# Patient Record
Sex: MALE | Race: WHITE | Employment: FULL TIME | URBAN - METROPOLITAN AREA
[De-identification: names, ages, dates, MRNs, and addresses within clinical notes are randomized per-mention and may not be internally consistent; named-entity substitution may affect disease eponyms.]

---

## 2017-11-09 PROBLEM — M17.11 OSTEOARTHRITIS OF RIGHT KNEE: Status: ACTIVE | Noted: 2017-11-09

## 2017-11-09 NOTE — H&P
Patient Name:  Kamille Stinson  YOB: 1965      Chief Complaint:  Left knee pain. History of Chief Complaint:  Radha Cantu comes in today for evaluation of left knee pain. He was previously evaluated for difficulty range of motion in the posterior and medial aspects for what seemed to be symptoms that were mechanical in nature. At last evaluation he underwent injection with minimal results. He comes in today for followup of his MRI. Past Medical/Surgical History:    Disease/Disorder Type Date Side Surgery Date Side Comment   Hypertension            Allergies:  No known allergies. Ingredient Reaction Medication Name Comment   NO KNOWN ALLERGIES          Current Medications:    Medication Directions   Mobic 15 mg tablet take 1 tablet by oral route  every day     Social History:    SMOKING  Status Tobacco Type Units Per Day Yrs Used   Never smoker          Family History:    Disease Detail Family Member Age Cause of Death Comments   Cancer, unknown Mother  N    Hypertension Mother  N      Review of Systems:    Pertinent positives include joint pain and joint stiffness. Pertinent negatives include fainting. Vitals:  Date BP Pulse Temp (F) Resp. (per min.) Height (Total in.) Weight (lbs.) BMI   09/29/2017     70.00  34.44     Physical Examination:    Psychiatric/General:  No acute distress; pleasant and accommodating. HEENT:  Moist mucous membranes intact. Lymphatic:  Neck is supple with no lymphadenopathy upon evaluation. Respiratory:   Equal bilateral chest wall movement with inspiration; no wheezes or strider audible. Cardiovascular:    Regular rate and rhythm, as noted by upper extremity pulses. Extremities: On evaluation of the left lower extremity, he continues to have tenderness upon palpation of the medial aspect of the knee, flexion and rotation reproduced symptoms about the medial aspect of the knee.  Gross motor is intact, skin is without ulceration or lesion. Data/Radiograph Evaluation:    MRI is reviewed and notes most significant findings in the medial compartment with meniscal tear, posterior horn medial meniscus and inferior articular surface tear with disruption consistent with a displaced tear and mild chondromalacic change of the patellofemoral joint is also noted. Assessment:   His symptoms seem to be mechanical and there is a posterior horn medial meniscus tear noted on MRI. Recommendation:  Will continue plans for surgical arthroscopy and debridement of medial meniscal tear. The risks include infection, bleeding, deep venous thrombosis, pulmonary embolism, heart attack, stroke, death, arthrofibrosis, need for manipulation, neurovascular compromise, need for revision surgical intervention, persistent long-term pain, need for chronic pain management, and metallic allergies. Will continue plans to schedule surgery.             Mei Oviedo DO

## 2017-11-09 NOTE — H&P
Patient Name:  Franco Ram  YOB: 1965      Chief Complaint:  Left knee pain. History of Chief Complaint:  Orlando Ding comes in today for evaluation of left knee pain. He was previously evaluated for difficulty range of motion in the posterior and medial aspects for what seemed to be symptoms that were mechanical in nature. At last evaluation he underwent injection with minimal results. He comes in today for followup of his MRI. Past Medical/Surgical History:    Disease/Disorder Type Date Side Surgery Date Side Comment   Hypertension            Allergies:  No known allergies. Ingredient Reaction Medication Name Comment   NO KNOWN ALLERGIES          Current Medications:    Medication Directions   Mobic 15 mg tablet take 1 tablet by oral route  every day     Social History:    SMOKING  Status Tobacco Type Units Per Day Yrs Used   Never smoker          Family History:    Disease Detail Family Member Age Cause of Death Comments   Cancer, unknown Mother  N    Hypertension Mother  N      Review of Systems:    Pertinent positives include joint pain and joint stiffness. Pertinent negatives include fainting. Vitals:  Date BP Pulse Temp (F) Resp. (per min.) Height (Total in.) Weight (lbs.) BMI   09/29/2017     70.00  34.44     Physical Examination:    Psychiatric/General:  No acute distress; pleasant and accommodating. HEENT:  Moist mucous membranes intact. Lymphatic:  Neck is supple with no lymphadenopathy upon evaluation. Respiratory:   Equal bilateral chest wall movement with inspiration; no wheezes or strider audible. Cardiovascular:    Regular rate and rhythm, as noted by upper extremity pulses. Extremities: On evaluation of the left lower extremity, he continues to have tenderness upon palpation of the medial aspect of the knee, flexion and rotation reproduced symptoms about the medial aspect of the knee.  Gross motor is intact, skin is without ulceration or lesion. Data/Radiograph Evaluation:    MRI is reviewed and notes most significant findings in the medial compartment with meniscal tear, posterior horn medial meniscus and inferior articular surface tear with disruption consistent with a displaced tear and mild chondromalacic change of the patellofemoral joint is also noted. Assessment:   His symptoms seem to be mechanical and there is a posterior horn medial meniscus tear noted on MRI. Recommendation:  Will continue plans for surgical arthroscopy and debridement of medial meniscal tear. The risks include infection, bleeding, deep venous thrombosis, pulmonary embolism, heart attack, stroke, death, arthrofibrosis, need for manipulation, neurovascular compromise, need for revision surgical intervention, persistent long-term pain, need for chronic pain management, and metallic allergies. Will continue plans to schedule surgery.             Aris Curry, DO

## 2017-11-09 NOTE — DISCHARGE INSTRUCTIONS
[facility]  Dr. Alves Goes Post-Operative Instructions Knee Arthroscopy Scope    Diet:  1. Begin with liquids and light foods such as Jell-O and soups. 2. Advance as tolerated to your regular diet if not nauseated. First 24 hours:  1. Be in the care of a responsible adult. 2. Do not drive or operate machinery. 3. Do not drink alcoholic beverages. Activities:  1. Elevate the limb above hip and preferably above chest for 48 hours. 2. Ice should be applied to the knee in a waterproof bag for 15-30 minutes each hour while awake for first 48 hours. 3. Normal walking is encouraged after 2 days. 4. Do not engage in activities that increase your pain such as stair-climbing or prolonged standing. 5. Return to work depends on your type of employment. 6.  Follow-up with Dr. Ferdinand Agosto in 7-10 days post-operatively. Exercise:  1. Begin exercises the day of surgery for both legs and repeat hourly while awake:  *Quad sets (tightening the thigh muscles)  *? Straight leg raises (lift and hold 12-18 off bed or floor for 8 count)  *? Vigorous ankle pumps (toes towards and away from head)  2. Your routine exercises generally can be started one week after surgery as long as you can bend the knee freely to at least 90 degrees. Wound Care:  1. Maintain your postoperative dressing. Loosen the ACE wrap if swelling of the foot or ankle occurs. 2. Remove your surgical dressing on the second post op day. Cover the wounds with Band-Aids and re-wrap the ACE bandage until swelling of knee is gone. To maintain good circulation, do not wrap too tightly. 3. Keep the surgical incisions dry until your sutures are removed when you see your doctor. Use a plastic bag with rubber bands to cover the limb during showers. Immersing the limb in water is to be avoided. Medications:  1. Strong oral narcotic pain medications have been prescribed for the first few days. Use only as directed.  No pain medication is capable of taking away all the pain. Taking your pills at regular intervals will give you the best chance of having less pain. 2. If you need a refill PLEASE PLAN AHEAD. Call our office during regular hours (8-5). 3. Do not combine with alcoholic beverages. 4. Be careful as you walk, climb stairs or drive as mild dizziness is not unusual.  5. Do not take medications that have not been prescribed by your surgeon. 6. You may switch to over the counter pain medication of your choice as you become more comfortable. WHEN TO CALL YOUR SURGEON:  1. Significant swelling or any new numbness in the limb that was operated on  2. Unrelenting pain  3. Fever or Chills  4. Redness around incisions  5. Color change in foot or toes  6. Continuous drainage or bleeding from wounds (a small amount of drainage is expected)  7. Any other worrisome condition    WHEN TO CALL YOUR REGULAR DOCTOR:  1. Flare up of any of your regular medical conditions    WHEN TO CALL 911:  1. Chest Pain  2. Shortness of Breath  3. Any other acute serious condition    CALL THE OFFICE:   If you have severe pain unrelieved by the medications;   If you have a fever of 101.0°F or greater;    If you notice excessive swelling, redness, or persistent drainage from the incision or IV site; [facility] office number is [phone] from 8:00am to 5:00pm Monday through Friday. After 5:00pm, on weekends, or holidays, please leave a message with our answering service and the doctor on-call will get back to you shortly. DISCHARGE SUMMARY from Nurse    PATIENT INSTRUCTIONS:    After general anesthesia or intravenous sedation, for 24 hours or while taking prescription Narcotics:  · Limit your activities  · Do not drive and operate hazardous machinery  · Do not make important personal or business decisions  · Do  not drink alcoholic beverages  · If you have not urinated within 8 hours after discharge, please contact your surgeon on call.     Report the following to your surgeon:  · Excessive pain, swelling, redness or odor of or around the surgical area  · Temperature over 100.5  · Nausea and vomiting lasting longer than 4 hours or if unable to take medications  · Any signs of decreased circulation or nerve impairment to extremity: change in color, persistent  numbness, tingling, coldness or increase pain  · Any questions    What to do at Home:  Recommended activity: Ambulate in house,     If you experience any of the following symptoms above, please follow up with Dr. Linnea Spicer. *  Please give a list of your current medications to your Primary Care Provider. *  Please update this list whenever your medications are discontinued, doses are      changed, or new medications (including over-the-counter products) are added. *  Please carry medication information at all times in case of emergency situations. These are general instructions for a healthy lifestyle:    No smoking/ No tobacco products/ Avoid exposure to second hand smoke  Surgeon General's Warning:  Quitting smoking now greatly reduces serious risk to your health. Obesity, smoking, and sedentary lifestyle greatly increases your risk for illness    A healthy diet, regular physical exercise & weight monitoring are important for maintaining a healthy lifestyle    You may be retaining fluid if you have a history of heart failure or if you experience any of the following symptoms:  Weight gain of 3 pounds or more overnight or 5 pounds in a week, increased swelling in our hands or feet or shortness of breath while lying flat in bed. Please call your doctor as soon as you notice any of these symptoms; do not wait until your next office visit.     Recognize signs and symptoms of STROKE:    F-face looks uneven    A-arms unable to move or move unevenly    S-speech slurred or non-existent    T-time-call 911 as soon as signs and symptoms begin-DO NOT go       Back to bed or wait to see if you get better-TIME IS BRAIN. Warning Signs of HEART ATTACK     Call 911 if you have these symptoms:   Chest discomfort. Most heart attacks involve discomfort in the center of the chest that lasts more than a few minutes, or that goes away and comes back. It can feel like uncomfortable pressure, squeezing, fullness, or pain.  Discomfort in other areas of the upper body. Symptoms can include pain or discomfort in one or both arms, the back, neck, jaw, or stomach.  Shortness of breath with or without chest discomfort.  Other signs may include breaking out in a cold sweat, nausea, or lightheadedness. Don't wait more than five minutes to call 911 - MINUTES MATTER! Fast action can save your life. Calling 911 is almost always the fastest way to get lifesaving treatment. Emergency Medical Services staff can begin treatment when they arrive -- up to an hour sooner than if someone gets to the hospital by car. Patient armband removed and shredded     The discharge information has been reviewed with the patient and caregiver. The patient and caregiver verbalized understanding. Discharge medications reviewed with the patient and caregiver and appropriate educational materials and side effects teaching were provided. ___________________________________________________________________________________________________________________________________  .

## 2017-11-10 ENCOUNTER — HOSPITAL ENCOUNTER (OUTPATIENT)
Dept: PREADMISSION TESTING | Age: 52
Discharge: HOME | End: 2017-11-10
Payer: COMMERCIAL

## 2017-11-10 LAB
ANION GAP SERPL CALC-SCNC: 4 MMOL/L (ref 3–18)
APPEARANCE UR: CLEAR
ATRIAL RATE: 71 BPM
BACTERIA URNS QL MICRO: NEGATIVE /HPF
BILIRUB UR QL: NEGATIVE
BUN SERPL-MCNC: 17 MG/DL (ref 7–18)
BUN/CREAT SERPL: 17 (ref 12–20)
CALCIUM SERPL-MCNC: 9.6 MG/DL (ref 8.5–10.1)
CALCULATED P AXIS, ECG09: 51 DEGREES
CALCULATED R AXIS, ECG10: 18 DEGREES
CALCULATED T AXIS, ECG11: 24 DEGREES
CHLORIDE SERPL-SCNC: 103 MMOL/L (ref 100–108)
CO2 SERPL-SCNC: 28 MMOL/L (ref 21–32)
COLOR UR: YELLOW
CREAT SERPL-MCNC: 1.03 MG/DL (ref 0.6–1.3)
DIAGNOSIS, 93000: NORMAL
EPITH CASTS URNS QL MICRO: NEGATIVE /LPF (ref 0–5)
ERYTHROCYTE [DISTWIDTH] IN BLOOD BY AUTOMATED COUNT: 13.6 % (ref 11.6–14.5)
GLUCOSE SERPL-MCNC: 100 MG/DL (ref 74–99)
GLUCOSE UR STRIP.AUTO-MCNC: NEGATIVE MG/DL
HCT VFR BLD AUTO: 44 % (ref 36–48)
HGB BLD-MCNC: 15.1 G/DL (ref 13–16)
HGB UR QL STRIP: ABNORMAL
KETONES UR QL STRIP.AUTO: NEGATIVE MG/DL
LEUKOCYTE ESTERASE UR QL STRIP.AUTO: NEGATIVE
MCH RBC QN AUTO: 29.3 PG (ref 24–34)
MCHC RBC AUTO-ENTMCNC: 34.3 G/DL (ref 31–37)
MCV RBC AUTO: 85.4 FL (ref 74–97)
NITRITE UR QL STRIP.AUTO: NEGATIVE
P-R INTERVAL, ECG05: 160 MS
PH UR STRIP: 6 [PH] (ref 5–8)
PLATELET # BLD AUTO: 343 K/UL (ref 135–420)
PMV BLD AUTO: 9.6 FL (ref 9.2–11.8)
POTASSIUM SERPL-SCNC: 4.3 MMOL/L (ref 3.5–5.5)
PROT UR STRIP-MCNC: NEGATIVE MG/DL
Q-T INTERVAL, ECG07: 356 MS
QRS DURATION, ECG06: 84 MS
QTC CALCULATION (BEZET), ECG08: 386 MS
RBC # BLD AUTO: 5.15 M/UL (ref 4.7–5.5)
RBC #/AREA URNS HPF: NORMAL /HPF (ref 0–5)
SODIUM SERPL-SCNC: 135 MMOL/L (ref 136–145)
SP GR UR REFRACTOMETRY: 1.02 (ref 1–1.03)
UROBILINOGEN UR QL STRIP.AUTO: 1 EU/DL (ref 0.2–1)
VENTRICULAR RATE, ECG03: 71 BPM
WBC # BLD AUTO: 9.5 K/UL (ref 4.6–13.2)
WBC URNS QL MICRO: NORMAL /HPF (ref 0–5)

## 2017-11-10 PROCEDURE — 36415 COLL VENOUS BLD VENIPUNCTURE: CPT | Performed by: ORTHOPAEDIC SURGERY

## 2017-11-10 PROCEDURE — 93005 ELECTROCARDIOGRAM TRACING: CPT

## 2017-11-10 PROCEDURE — 81001 URINALYSIS AUTO W/SCOPE: CPT | Performed by: ORTHOPAEDIC SURGERY

## 2017-11-10 PROCEDURE — 85027 COMPLETE CBC AUTOMATED: CPT | Performed by: ORTHOPAEDIC SURGERY

## 2017-11-10 PROCEDURE — 80048 BASIC METABOLIC PNL TOTAL CA: CPT | Performed by: ORTHOPAEDIC SURGERY

## 2017-11-21 ENCOUNTER — HOSPITAL ENCOUNTER (OUTPATIENT)
Age: 52
Setting detail: OUTPATIENT SURGERY
Discharge: HOME | End: 2017-11-21
Attending: ORTHOPAEDIC SURGERY | Admitting: ORTHOPAEDIC SURGERY
Payer: COMMERCIAL

## 2017-11-21 ENCOUNTER — ANESTHESIA (OUTPATIENT)
Dept: SURGERY | Age: 52
End: 2017-11-21
Payer: COMMERCIAL

## 2017-11-21 ENCOUNTER — ANESTHESIA EVENT (OUTPATIENT)
Dept: SURGERY | Age: 52
End: 2017-11-21
Payer: COMMERCIAL

## 2017-11-21 VITALS
BODY MASS INDEX: 34.23 KG/M2 | HEIGHT: 70 IN | RESPIRATION RATE: 16 BRPM | TEMPERATURE: 97.6 F | SYSTOLIC BLOOD PRESSURE: 137 MMHG | DIASTOLIC BLOOD PRESSURE: 73 MMHG | OXYGEN SATURATION: 97 % | WEIGHT: 239.06 LBS | HEART RATE: 70 BPM

## 2017-11-21 PROBLEM — M23.322 MEDIAL MENISCUS, POSTERIOR HORN DERANGEMENT, LEFT: Status: ACTIVE | Noted: 2017-11-21

## 2017-11-21 PROCEDURE — 77030002933 HC SUT MCRYL J&J -A: Performed by: ORTHOPAEDIC SURGERY

## 2017-11-21 PROCEDURE — 76060000032 HC ANESTHESIA 0.5 TO 1 HR: Performed by: ORTHOPAEDIC SURGERY

## 2017-11-21 PROCEDURE — 74011000250 HC RX REV CODE- 250

## 2017-11-21 PROCEDURE — 77030034478 HC TU IRR ARTHRO PT ARTH -B: Performed by: ORTHOPAEDIC SURGERY

## 2017-11-21 PROCEDURE — 74011250636 HC RX REV CODE- 250/636

## 2017-11-21 PROCEDURE — 77030032490 HC SLV COMPR SCD KNE COVD -B: Performed by: ORTHOPAEDIC SURGERY

## 2017-11-21 PROCEDURE — 76210000006 HC OR PH I REC 0.5 TO 1 HR: Performed by: ORTHOPAEDIC SURGERY

## 2017-11-21 PROCEDURE — 74011000250 HC RX REV CODE- 250: Performed by: ORTHOPAEDIC SURGERY

## 2017-11-21 PROCEDURE — 74011250636 HC RX REV CODE- 250/636: Performed by: ORTHOPAEDIC SURGERY

## 2017-11-21 PROCEDURE — 76210000021 HC REC RM PH II 0.5 TO 1 HR: Performed by: ORTHOPAEDIC SURGERY

## 2017-11-21 PROCEDURE — 76010000138 HC OR TIME 0.5 TO 1 HR: Performed by: ORTHOPAEDIC SURGERY

## 2017-11-21 PROCEDURE — 77030018835 HC SOL IRR LR ICUM -A: Performed by: ORTHOPAEDIC SURGERY

## 2017-11-21 PROCEDURE — 77030036563 HC WRP CLD THER KNE S2SG -B: Performed by: ORTHOPAEDIC SURGERY

## 2017-11-21 PROCEDURE — 77030012508 HC MSK AIRWY LMA AMBU -A: Performed by: ANESTHESIOLOGY

## 2017-11-21 PROCEDURE — 77030020782 HC GWN BAIR PAWS FLX 3M -B: Performed by: ORTHOPAEDIC SURGERY

## 2017-11-21 PROCEDURE — 77030006884 HC BLD SHV INCIS S&N -B: Performed by: ORTHOPAEDIC SURGERY

## 2017-11-21 PROCEDURE — 74011250636 HC RX REV CODE- 250/636: Performed by: ANESTHESIOLOGY

## 2017-11-21 RX ORDER — ONDANSETRON 2 MG/ML
INJECTION INTRAMUSCULAR; INTRAVENOUS AS NEEDED
Status: DISCONTINUED | OUTPATIENT
Start: 2017-11-21 | End: 2017-11-21 | Stop reason: HOSPADM

## 2017-11-21 RX ORDER — SODIUM CHLORIDE 0.9 % (FLUSH) 0.9 %
5-10 SYRINGE (ML) INJECTION AS NEEDED
Status: DISCONTINUED | OUTPATIENT
Start: 2017-11-21 | End: 2017-11-21 | Stop reason: HOSPADM

## 2017-11-21 RX ORDER — ONDANSETRON 2 MG/ML
4 INJECTION INTRAMUSCULAR; INTRAVENOUS ONCE
Status: DISCONTINUED | OUTPATIENT
Start: 2017-11-21 | End: 2017-11-21 | Stop reason: HOSPADM

## 2017-11-21 RX ORDER — CEFAZOLIN SODIUM 2 G/50ML
2 SOLUTION INTRAVENOUS ONCE
Status: DISCONTINUED | OUTPATIENT
Start: 2017-11-21 | End: 2017-11-21 | Stop reason: HOSPADM

## 2017-11-21 RX ORDER — GLYCOPYRROLATE 0.2 MG/ML
INJECTION INTRAMUSCULAR; INTRAVENOUS AS NEEDED
Status: DISCONTINUED | OUTPATIENT
Start: 2017-11-21 | End: 2017-11-21 | Stop reason: HOSPADM

## 2017-11-21 RX ORDER — PROPOFOL 10 MG/ML
INJECTION, EMULSION INTRAVENOUS AS NEEDED
Status: DISCONTINUED | OUTPATIENT
Start: 2017-11-21 | End: 2017-11-21 | Stop reason: HOSPADM

## 2017-11-21 RX ORDER — MAGNESIUM SULFATE 100 %
4 CRYSTALS MISCELLANEOUS AS NEEDED
Status: DISCONTINUED | OUTPATIENT
Start: 2017-11-21 | End: 2017-11-21 | Stop reason: HOSPADM

## 2017-11-21 RX ORDER — FENTANYL CITRATE 50 UG/ML
INJECTION, SOLUTION INTRAMUSCULAR; INTRAVENOUS AS NEEDED
Status: DISCONTINUED | OUTPATIENT
Start: 2017-11-21 | End: 2017-11-21 | Stop reason: HOSPADM

## 2017-11-21 RX ORDER — DOCUSATE SODIUM 100 MG/1
100 CAPSULE, LIQUID FILLED ORAL 2 TIMES DAILY
COMMUNITY

## 2017-11-21 RX ORDER — IBUPROFEN 200 MG
TABLET ORAL
COMMUNITY

## 2017-11-21 RX ORDER — ASPIRIN 325 MG
325 TABLET ORAL DAILY
COMMUNITY

## 2017-11-21 RX ORDER — FENTANYL CITRATE 50 UG/ML
25 INJECTION, SOLUTION INTRAMUSCULAR; INTRAVENOUS
Status: DISPENSED | OUTPATIENT
Start: 2017-11-21 | End: 2017-11-21

## 2017-11-21 RX ORDER — SODIUM CHLORIDE, SODIUM LACTATE, POTASSIUM CHLORIDE, CALCIUM CHLORIDE 600; 310; 30; 20 MG/100ML; MG/100ML; MG/100ML; MG/100ML
50 INJECTION, SOLUTION INTRAVENOUS CONTINUOUS
Status: DISCONTINUED | OUTPATIENT
Start: 2017-11-21 | End: 2017-11-21 | Stop reason: HOSPADM

## 2017-11-21 RX ORDER — DEXTROSE 50 % IN WATER (D50W) INTRAVENOUS SYRINGE
25-50 AS NEEDED
Status: DISCONTINUED | OUTPATIENT
Start: 2017-11-21 | End: 2017-11-21 | Stop reason: HOSPADM

## 2017-11-21 RX ORDER — TAMSULOSIN HYDROCHLORIDE 0.4 MG/1
0.4 CAPSULE ORAL DAILY
COMMUNITY

## 2017-11-21 RX ORDER — INSULIN LISPRO 100 [IU]/ML
INJECTION, SOLUTION INTRAVENOUS; SUBCUTANEOUS ONCE
Status: DISCONTINUED | OUTPATIENT
Start: 2017-11-21 | End: 2017-11-21 | Stop reason: HOSPADM

## 2017-11-21 RX ORDER — NALOXONE HYDROCHLORIDE 0.4 MG/ML
0.1 INJECTION, SOLUTION INTRAMUSCULAR; INTRAVENOUS; SUBCUTANEOUS
Status: DISCONTINUED | OUTPATIENT
Start: 2017-11-21 | End: 2017-11-21 | Stop reason: HOSPADM

## 2017-11-21 RX ORDER — LIDOCAINE HYDROCHLORIDE 20 MG/ML
INJECTION, SOLUTION EPIDURAL; INFILTRATION; INTRACAUDAL; PERINEURAL AS NEEDED
Status: DISCONTINUED | OUTPATIENT
Start: 2017-11-21 | End: 2017-11-21 | Stop reason: HOSPADM

## 2017-11-21 RX ORDER — OXYCODONE AND ACETAMINOPHEN 5; 325 MG/1; MG/1
1 TABLET ORAL AS NEEDED
Status: DISCONTINUED | OUTPATIENT
Start: 2017-11-21 | End: 2017-11-21 | Stop reason: HOSPADM

## 2017-11-21 RX ORDER — HYDROMORPHONE HYDROCHLORIDE 2 MG/ML
0.5 INJECTION, SOLUTION INTRAMUSCULAR; INTRAVENOUS; SUBCUTANEOUS
Status: DISCONTINUED | OUTPATIENT
Start: 2017-11-21 | End: 2017-11-21 | Stop reason: HOSPADM

## 2017-11-21 RX ORDER — HYDROCODONE BITARTRATE AND ACETAMINOPHEN 5; 325 MG/1; MG/1
1 TABLET ORAL
Qty: 30 TAB | Refills: 0 | Status: SHIPPED | OUTPATIENT
Start: 2017-11-21

## 2017-11-21 RX ORDER — SODIUM CHLORIDE, SODIUM LACTATE, POTASSIUM CHLORIDE, CALCIUM CHLORIDE 600; 310; 30; 20 MG/100ML; MG/100ML; MG/100ML; MG/100ML
125 INJECTION, SOLUTION INTRAVENOUS CONTINUOUS
Status: DISCONTINUED | OUTPATIENT
Start: 2017-11-21 | End: 2017-11-21 | Stop reason: HOSPADM

## 2017-11-21 RX ORDER — KETOROLAC TROMETHAMINE 30 MG/ML
INJECTION, SOLUTION INTRAMUSCULAR; INTRAVENOUS AS NEEDED
Status: DISCONTINUED | OUTPATIENT
Start: 2017-11-21 | End: 2017-11-21 | Stop reason: HOSPADM

## 2017-11-21 RX ORDER — MIDAZOLAM HYDROCHLORIDE 1 MG/ML
INJECTION, SOLUTION INTRAMUSCULAR; INTRAVENOUS AS NEEDED
Status: DISCONTINUED | OUTPATIENT
Start: 2017-11-21 | End: 2017-11-21 | Stop reason: HOSPADM

## 2017-11-21 RX ORDER — DEXAMETHASONE SODIUM PHOSPHATE 4 MG/ML
INJECTION, SOLUTION INTRA-ARTICULAR; INTRALESIONAL; INTRAMUSCULAR; INTRAVENOUS; SOFT TISSUE AS NEEDED
Status: DISCONTINUED | OUTPATIENT
Start: 2017-11-21 | End: 2017-11-21 | Stop reason: HOSPADM

## 2017-11-21 RX ADMIN — FENTANYL CITRATE 25 MCG: 50 INJECTION, SOLUTION INTRAMUSCULAR; INTRAVENOUS at 10:27

## 2017-11-21 RX ADMIN — ONDANSETRON 4 MG: 2 INJECTION INTRAMUSCULAR; INTRAVENOUS at 09:29

## 2017-11-21 RX ADMIN — SODIUM CHLORIDE, SODIUM LACTATE, POTASSIUM CHLORIDE, AND CALCIUM CHLORIDE 125 ML/HR: 600; 310; 30; 20 INJECTION, SOLUTION INTRAVENOUS at 09:54

## 2017-11-21 RX ADMIN — PROPOFOL 150 MG: 10 INJECTION, EMULSION INTRAVENOUS at 09:05

## 2017-11-21 RX ADMIN — FENTANYL CITRATE 25 MCG: 50 INJECTION, SOLUTION INTRAMUSCULAR; INTRAVENOUS at 09:58

## 2017-11-21 RX ADMIN — GLYCOPYRROLATE 0.3 MG: 0.2 INJECTION INTRAMUSCULAR; INTRAVENOUS at 09:12

## 2017-11-21 RX ADMIN — DEXAMETHASONE SODIUM PHOSPHATE 4 MG: 4 INJECTION, SOLUTION INTRA-ARTICULAR; INTRALESIONAL; INTRAMUSCULAR; INTRAVENOUS; SOFT TISSUE at 09:14

## 2017-11-21 RX ADMIN — LIDOCAINE HYDROCHLORIDE 60 MG: 20 INJECTION, SOLUTION EPIDURAL; INFILTRATION; INTRACAUDAL; PERINEURAL at 09:05

## 2017-11-21 RX ADMIN — SODIUM CHLORIDE, SODIUM LACTATE, POTASSIUM CHLORIDE, AND CALCIUM CHLORIDE 125 ML/HR: 600; 310; 30; 20 INJECTION, SOLUTION INTRAVENOUS at 07:46

## 2017-11-21 RX ADMIN — FENTANYL CITRATE 25 MCG: 50 INJECTION, SOLUTION INTRAMUSCULAR; INTRAVENOUS at 10:11

## 2017-11-21 RX ADMIN — MIDAZOLAM HYDROCHLORIDE 2 MG: 1 INJECTION, SOLUTION INTRAMUSCULAR; INTRAVENOUS at 08:58

## 2017-11-21 RX ADMIN — FENTANYL CITRATE 100 MCG: 50 INJECTION, SOLUTION INTRAMUSCULAR; INTRAVENOUS at 09:05

## 2017-11-21 RX ADMIN — KETOROLAC TROMETHAMINE 30 MG: 30 INJECTION, SOLUTION INTRAMUSCULAR; INTRAVENOUS at 09:18

## 2017-11-21 NOTE — OP NOTES
OPERATIVE NOTE    Patient: Linda Joyner MRN: [id number]  SSN: [id number]    YOB: 1965  Age: 45 y.o. Sex: male      Indications: This is a 45y.o. year-old male who presents with knee pain. He was positive for meniscal tear on MRI. The patient was admitted for surgery as conservative measures have failed. Date of Procedure: 11/21/2017     Preoperative Diagnosis: LEFT KNEE MEDIAL MENISCAL TEAR    Postoperative Diagnosis: LEFT KNEE MEDIAL MENISCAL TEAR      Procedure: Procedure(s):  LEFT KNEE ARTHROSCOPY/PARTIAL MEDIAL MENISECTOMY    Surgeon(s) and Role:     * Charla Vincent, DO - Primary    Anesthesia: General LMA    Estimated Blood Loss: 5ml    Specimens: * No specimens in log *     Drains: none    Implants: * No implants in log *    Complications: None; patient tolerated the procedure well. Procedure: The patient was greeted by anesthesia and taken to the operative suite, where she underwent general endotracheal anesthesia. The patient was positioned in the supine position on a standard orthopedic table. The left leg was secured with a surgical assist leg snell and sterilely prepped and draped in standard fashion. Standard inferomedial and inferolateral portals were made with a 15-blade scalpel. A 30-degree arthroscope was placed through the lateral portal into the suprapatellar  Pouch. Evaluation of the patellofemoral compartment showed grade 3 changes of the cartilage surface. Evaluation of the medial compartment showed grade 3 changes of the cartilgenus surface. The anterior and posterior cruciate ligament were found to be intact. Grade 1 changes were noted over the lateral compartment. The menesci were evaluated and probed. The medial compartment demonstrated a complex tear . The lateral compartment demonstrated no significant abnormalities.   Arthroscopic meniscectomy was performed with a shaver where appropriated until no loose or unstable cartilage remained upon probing. A chondroplasty was performed over the articular surface of the medial femoral condyle and patella and trochlea. Additional procedures included anterior synovectomy. The knee was irrigated with the remainder of the arthroscopic lavage and injected with 30 ml of .25% Marcaine with Epinephrine and 4 mg of Morphine sulfate. The incisions were reapproximated with Monocryl suture in subcutaneous horizontal mattress fashion x 2. A soft sterile dressing and an ace wrap were placed. The patient recovered from anesthesia and was transferred to the post-anesthesia care unit in stable condition.

## 2017-11-21 NOTE — ANESTHESIA PREPROCEDURE EVALUATION
Anesthetic History   No history of anesthetic complications            Review of Systems / Medical History  Patient summary reviewed, nursing notes reviewed and pertinent labs reviewed    Pulmonary  Within defined limits                 Neuro/Psych   Within defined limits           Cardiovascular    Hypertension                   GI/Hepatic/Renal  Within defined limits              Endo/Other  Within defined limits           Other Findings              Physical Exam    Airway  Mallampati: II  TM Distance: 4 - 6 cm  Neck ROM: normal range of motion   Mouth opening: Normal     Cardiovascular  Regular rate and rhythm,  S1 and S2 normal,  no murmur, click, rub, or gallop             Dental  No notable dental hx       Pulmonary  Breath sounds clear to auscultation               Abdominal  Abdominal exam normal       Other Findings            Anesthetic Plan    ASA: 2  Anesthesia type: general          Induction: Intravenous  Anesthetic plan and risks discussed with: Family and Patient

## 2017-11-21 NOTE — ANESTHESIA POSTPROCEDURE EVALUATION
Post-Anesthesia Evaluation and Assessment    Cardiovascular Function/Vital Signs  Visit Vitals    /66    Pulse 71    Temp 36.3 °C (97.3 °F)    Resp 12    Ht 5' 10\" (1.778 m)    Wt 108.4 kg (239 lb 1 oz)    SpO2 99%    BMI 34.3 kg/m2       Patient is status post Procedure(s):  LEFT KNEE ARTHROSCOPY/PARTIAL MEDIAL MENISECTOMY. Nausea/Vomiting: Controlled. Postoperative hydration reviewed and adequate. Pain:  Pain Scale 1: Numeric (0 - 10) (11/21/17 1031)  Pain Intensity 1: 3 (11/21/17 1031)   Managed. Neurological Status:   Neuro (WDL): Within Defined Limits (11/21/17 1015)   At baseline. Mental Status and Level of Consciousness: Baseline and stable. Pulmonary Status:   O2 Device: Nasal cannula (11/21/17 1015)   Adequate oxygenation and airway patent. Complications related to anesthesia: None    Post-anesthesia assessment completed. No concerns. Patient has met all discharge requirements.     Signed By: Elena Ramos MD

## 2017-11-21 NOTE — IP AVS SNAPSHOT
[address] [address] 
[phone] Patient: Yoshi Carter MRN: [id number] About your hospitalization You were admitted on:  November 21, 2017 You last received care in the:  [facility] PACU You were discharged on:  November 21, 2017 Why you were hospitalized Your primary diagnosis was:  Medial Meniscus, Posterior Horn Derangement, Left Your diagnoses also included:  Osteoarthritis Of Right Knee Things You Need To Do (next 8 weeks) Follow up with Zenaida Bojorquez MD  
  
Phone:  [phone] Where:  [address], [address], [address] [place] [address] Follow up with Barbara Shahid DO Call office to make follow up appointment Phone:  [phone] Where:  [address], Orthopedics and [address] [place] [address] Discharge Orders None A check jason indicates which time of day the medication should be taken. My Medications TAKE these medications as instructed Instructions Each Dose to Equal  
 Morning Noon Evening Bedtime  
 aspirin 325 mg tablet Commonly known as:  ASPIRIN Your last dose was: Your next dose is: Take 325 mg by mouth daily. 325 mg  
    
   
   
   
  
 COLACE 100 mg capsule Generic drug:  docusate sodium Your last dose was: Your next dose is: Take 100 mg by mouth two (2) times a day. 100 mg FLOMAX 0.4 mg capsule Generic drug:  tamsulosin Your last dose was: Your next dose is: Take 0.4 mg by mouth daily. 0.4 mg HYDROcodone-acetaminophen 5-325 mg per tablet Commonly known as:  March Castles Your last dose was: Your next dose is: Take 1 Tab by mouth every eight (8) hours as needed for Pain. Max Daily Amount: 3 Tabs. Indications: Pain 1 Tab ibuprofen 200 mg tablet Commonly known as:  MOTRIN Your last dose was: Your next dose is: Take  by mouth.  
     
   
   
   
  
 lisinopril 2.5 mg tablet Commonly known as:  Merilynn Toy Your last dose was: Your next dose is: Take 2.5 mg by mouth daily. Indications: hypertension 2.5 mg  
    
   
   
   
  
 TYLENOL ARTHRITIS PAIN 650 mg Tber Generic drug:  acetaminophen Your last dose was: Your next dose is: Take 650 mg by mouth every eight (8) hours. 650 mg Where to Get Your Medications Information on where to get these meds will be given to you by the nurse or doctor. ! Ask your nurse or doctor about these medications HYDROcodone-acetaminophen 5-325 mg per tablet Discharge Instructions [address] Dr. Chantell Beckman Diet: 1. Begin with liquids and light foods such as Jell-O and soups. 2. Advance as tolerated to your regular diet if not nauseated. First 24 hours: 
1. Be in the care of a responsible adult. 2. Do not drive or operate machinery. 3. Do not drink alcoholic beverages. Activities: 1. Elevate the limb above hip and preferably above chest for 48 hours. 2. Ice should be applied to the knee in a waterproof bag for 15-30 minutes each hour while awake for first 48 hours. 3. Normal walking is encouraged after 2 days. 4. Do not engage in activities that increase your pain such as stair-climbing or prolonged standing. 5. Return to work depends on your type of employment. 6.  Follow-up with Dr. Lorne Mohamud in 7-10 days post-operatively. Exercise: 1. Begin exercises the day of surgery for both legs and repeat hourly while awake: 
*Quad sets (tightening the thigh muscles) *? Straight leg raises (lift and hold 12-18 off bed or floor for 8 count) *?Vigorous ankle pumps (toes towards and away from head) 2. Your routine exercises generally can be started one week after surgery as long as you can bend the knee freely to at least 90 degrees. Wound Care: 1. Maintain your postoperative dressing. Loosen the ACE wrap if swelling of the foot or ankle occurs. 2. Remove your surgical dressing on the second post op day. Cover the wounds with Band-Aids and re-wrap the ACE bandage until swelling of knee is gone. To maintain good circulation, do not wrap too tightly. 3. Keep the surgical incisions dry until your sutures are removed when you see your doctor. Use a plastic bag with rubber bands to cover the limb during showers. Immersing the limb in water is to be avoided. Medications: 1. Strong oral narcotic pain medications have been prescribed for the first few days. Use only as directed. No pain medication is capable of taking away all the pain. Taking your pills at regular intervals will give you the best chance of having less pain. 2. If you need a refill PLEASE PLAN AHEAD. Call our office during regular hours (8-5). 3. Do not combine with alcoholic beverages. 4. Be careful as you walk, climb stairs or drive as mild dizziness is not unusual. 
5. Do not take medications that have not been prescribed by your surgeon. 6. You may switch to over the counter pain medication of your choice as you become more comfortable. WHEN TO CALL YOUR SURGEON: 
1. Significant swelling or any new numbness in the limb that was operated on 
2. Unrelenting pain 3. Fever or Chills 4. Redness around incisions 5. Color change in foot or toes 6. Continuous drainage or bleeding from wounds (a small amount of drainage is expected) 7. Any other worrisome condition WHEN TO CALL YOUR REGULAR DOCTOR: 
1. Flare up of any of your regular medical conditions WHEN TO CALL 911: 
1. Chest Pain 2. Shortness of Breath 3. Any other acute serious condition CALL THE OFFICE: 
 ? If you have severe pain unrelieved by the medications; 
? If you have a fever of 101.0°F or greater;  
? If you notice excessive swelling, redness, or persistent drainage from the incision or IV site; [facility] office number is [phone] from 8:00am to 5:00pm Monday through Friday. After 5:00pm, on weekends, or holidays, please leave a message with our answering service and the doctor on-call will get back to you shortly. DISCHARGE SUMMARY from Nurse PATIENT INSTRUCTIONS: 
 
 
F-face looks uneven A-arms unable to move or move unevenly S-speech slurred or non-existent T-time-call 911 as soon as signs and symptoms begin-DO NOT go Back to bed or wait to see if you get better-TIME IS BRAIN. Warning Signs of HEART ATTACK Call 911 if you have these symptoms: 
? Chest discomfort. Most heart attacks involve discomfort in the center of the chest that lasts more than a few minutes, or that goes away and comes back. It can feel like uncomfortable pressure, squeezing, fullness, or pain. ? Discomfort in other areas of the upper body. Symptoms can include pain or discomfort in one or both arms, the back, neck, jaw, or stomach. ? Shortness of breath with or without chest discomfort. ? Other signs may include breaking out in a cold sweat, nausea, or lightheadedness. Don't wait more than five minutes to call [address]! Fast action can save your life. Calling 911 is almost always the fastest way to get lifesaving treatment. Emergency Medical Services staff can begin treatment when they arrive  up to an hour sooner than if someone gets to the hospital by car. Patient armband removed and shredded If you have questions, please visit the Frequently Asked Questions section of the MyChart website. Remember, [employer]Me is NOT to be used for urgent needs. For medical emergencies, dial 911. Now available from your iPhone and Android! Providers Seen During Your Hospitalization Provider Specialty Primary office phone Suleiman Jamil, [facility] [phone] Your Primary Care Physician (PCP) Primary Care Physician Office Phone Office Fax Tete Spear [phone] You are allergic to the following No active allergies Recent Documentation Height Weight BMI Smoking Status 1.778 m 108.4 kg 34.3 kg/m2 Never Smoker Emergency Contacts Name Discharge Info Relation Home Work Mobile Curry Gonzalez DISCHARGE CAREGIVER [3] Friend [5]   [phone] Taylor Rodriguez DISCHARGE CAREGIVER [3] Daughter [21]   [phone] Patient Belongings The following personal items are in your possession at time of discharge: 
  Dental Appliances: None  Visual Aid: Glasses, With patient      Home Medications: None   Jewelry: None  Clothing: Pants, Shirt, Undergarments, Footwear, Socks, Jacket/Coat (with Marden Nataly)    Other Valuables: Wallet, Cell Phone, Eyeglasses (with Marden Nataly) Please provide this summary of care documentation to your next provider. Signatures-by signing, you are acknowledging that this After Visit Summary has been reviewed with you and you have received a copy. Patient Signature:  ____________________________________________________________ Date:  ____________________________________________________________  
  
Juan Batres Provider Signature:  ____________________________________________________________ Date:  ____________________________________________________________

## 2017-11-21 NOTE — INTERVAL H&P NOTE
H&P Update:  Dyanna Angelucci was seen and examined. History and physical has been reviewed. The patient has been examined. There have been no significant clinical changes since the completion of the originally dated History and Physical.  Patient identified by surgeon; surgical site was confirmed by patient and surgeon.   Plan for left knee arthroscopy    Signed By: Lilly Moncada DO     November 21, 2017 8:16 AM

## 2017-11-21 NOTE — BRIEF OP NOTE
BRIEF OPERATIVE NOTE    Date of Procedure: 11/21/2017   Preoperative Diagnosis: LEFT KNEE MEDIAL MENISCAL TEAR  Postoperative Diagnosis: LEFT KNEE MEDIAL MENISCAL TEAR    Procedure(s):  LEFT KNEE ARTHROSCOPY/PARTIAL MEDIAL MENISECTOMY  Surgeon(s) and Role:     * Robert Fontaine DO - Primary         Assistant Staff:  Physician Assistant: Destinee Esposito PA-C    Surgical Staff:  Circ-1: Nora Galloway  Physician Assistant: Destinee Esposito PA-C  Scrub Tech-1: Ida Hamilton  Scrub RN-1: Otto Fonseca RN  Surg Asst-1: Khushboo Hoffman  Event Time In   Incision Start 0914   Incision Close 0930     Anesthesia: General   Estimated Blood Loss: 5ml   ml  Specimens: * No specimens in log *   Findings: complex tear medial meniscus   Complications: none  Implants: * No implants in log *

## 2017-11-21 NOTE — PERIOP NOTES
TRANSFER - OUT REPORT:    Verbal report given to Pedro Diaz RN (name) on Humza Vargas  being transferred to Phase II (unit) for routine progression of care       Report consisted of patients Situation, Background, Assessment and   Recommendations(SBAR). Information from the following report(s) SBAR, Kardex, Procedure Summary and Intake/Output was reviewed with the receiving nurse. Lines:   Peripheral IV 11/21/17 Right Hand (Active)   Site Assessment Clean, dry, & intact 11/21/2017 10:20 AM   Phlebitis Assessment 0 11/21/2017 10:20 AM   Infiltration Assessment 0 11/21/2017 10:20 AM   Dressing Status Clean, dry, & intact 11/21/2017 10:20 AM   Dressing Type Transparent;Tape 11/21/2017 10:20 AM   Hub Color/Line Status Infusing 11/21/2017 10:20 AM        Opportunity for questions and clarification was provided.       Patient transported with:   O2 @ 2 liters

## (undated) DEVICE — STERILE POLYISOPRENE POWDER-FREE SURGICAL GLOVES: Brand: PROTEXIS

## (undated) DEVICE — PACK PROCEDURE SURG KNEE ARTHSCP CUST

## (undated) DEVICE — TUBE IRRIG L8IN LNG PT W/ CONN FOR PMP SYS REDEUCE

## (undated) DEVICE — STERILE POLYISOPRENE POWDER-FREE SURGICAL GLOVES WITH EMOLLIENT COATING: Brand: PROTEXIS

## (undated) DEVICE — SUT MONOCRYL PLUS UD 3-0 --

## (undated) DEVICE — GOWN,SIRUS,NONRNF,SETINSLV,2XL,18/CS: Brand: MEDLINE

## (undated) DEVICE — KENDALL SCD EXPRESS SLEEVES, KNEE LENGTH, MEDIUM: Brand: KENDALL SCD

## (undated) DEVICE — 3.5 MM INCISOR STRAIGHT BLADES,                                    POWER/EP-1, MEDIUM GRAY, PACKAGED 6                                    PER BOX, STERILE

## (undated) DEVICE — UNDERCAST PADDING: Brand: DEROYAL

## (undated) DEVICE — (D)PREP SKN CHLRAPRP APPL 26ML -- CONVERT TO ITEM 371833

## (undated) DEVICE — MASTISOL ADHESIVE LIQ 2/3ML

## (undated) DEVICE — (D)STRIP SKN CLSR 0.5X4IN WHT --

## (undated) DEVICE — SOLUTION IRRIG 3000ML LAC R FLX CONT